# Patient Record
Sex: MALE | Race: WHITE
[De-identification: names, ages, dates, MRNs, and addresses within clinical notes are randomized per-mention and may not be internally consistent; named-entity substitution may affect disease eponyms.]

---

## 2021-07-28 ENCOUNTER — HOSPITAL ENCOUNTER (EMERGENCY)
Dept: HOSPITAL 75 - ER FS | Age: 31
LOS: 1 days | Discharge: PSYCHIATRIC OR SUBSTANCE USE FACILITY | End: 2021-07-29
Payer: SELF-PAY

## 2021-07-28 VITALS — BODY MASS INDEX: 23.09 KG/M2 | WEIGHT: 164.91 LBS | HEIGHT: 70.98 IN

## 2021-07-28 DIAGNOSIS — Z63.0: ICD-10-CM

## 2021-07-28 DIAGNOSIS — R45.851: Primary | ICD-10-CM

## 2021-07-28 DIAGNOSIS — Z20.822: ICD-10-CM

## 2021-07-28 DIAGNOSIS — F17.210: ICD-10-CM

## 2021-07-28 DIAGNOSIS — F32.9: ICD-10-CM

## 2021-07-28 LAB
ALBUMIN SERPL-MCNC: 4.3 GM/DL (ref 3.2–4.5)
ALP SERPL-CCNC: 99 U/L (ref 40–136)
ALT SERPL-CCNC: 14 U/L (ref 0–55)
APAP SERPL-MCNC: < 10 UG/ML (ref 10–30)
APTT PPP: YELLOW S
BACTERIA #/AREA URNS HPF: NEGATIVE /HPF
BARBITURATES UR QL: NEGATIVE
BASOPHILS # BLD AUTO: 0 10^3/UL (ref 0–0.1)
BASOPHILS NFR BLD AUTO: 1 % (ref 0–10)
BENZODIAZ UR QL SCN: NEGATIVE
BILIRUB SERPL-MCNC: 0.2 MG/DL (ref 0.1–1)
BILIRUB UR QL STRIP: NEGATIVE
BUN/CREAT SERPL: 9
CALCIUM SERPL-MCNC: 9.1 MG/DL (ref 8.5–10.1)
CHLORIDE SERPL-SCNC: 105 MMOL/L (ref 98–107)
CO2 SERPL-SCNC: 24 MMOL/L (ref 21–32)
COCAINE UR QL: NEGATIVE
CREAT SERPL-MCNC: 0.8 MG/DL (ref 0.6–1.3)
EOSINOPHIL # BLD AUTO: 0.1 10^3/UL (ref 0–0.3)
EOSINOPHIL NFR BLD AUTO: 2 % (ref 0–10)
FIBRINOGEN PPP-MCNC: CLEAR MG/DL
GFR SERPLBLD BASED ON 1.73 SQ M-ARVRAT: 115 ML/MIN
GLUCOSE SERPL-MCNC: 104 MG/DL (ref 70–105)
GLUCOSE UR STRIP-MCNC: NEGATIVE MG/DL
HCT VFR BLD CALC: 40 % (ref 40–54)
HGB BLD-MCNC: 14.2 G/DL (ref 13.3–17.7)
HYALINE CASTS #/AREA URNS LPF: (no result) /LPF
KETONES UR QL STRIP: (no result)
LEUKOCYTE ESTERASE UR QL STRIP: NEGATIVE
LYMPHOCYTES # BLD AUTO: 1.3 X 10^3 (ref 1–4)
LYMPHOCYTES NFR BLD AUTO: 31 % (ref 12–44)
MANUAL DIFFERENTIAL PERFORMED BLD QL: NO
MCH RBC QN AUTO: 31 PG (ref 25–34)
MCHC RBC AUTO-ENTMCNC: 35 G/DL (ref 32–36)
MCV RBC AUTO: 89 FL (ref 80–99)
METHADONE UR QL SCN: NEGATIVE
METHAMPHETAMINE SCREEN URINE S: NEGATIVE
MONOCYTES # BLD AUTO: 0.5 X 10^3 (ref 0–1)
MONOCYTES NFR BLD AUTO: 12 % (ref 0–12)
NEUTROPHILS # BLD AUTO: 2.3 X 10^3 (ref 1.8–7.8)
NEUTROPHILS NFR BLD AUTO: 54 % (ref 42–75)
NITRITE UR QL STRIP: NEGATIVE
OPIATES UR QL SCN: NEGATIVE
OXYCODONE UR QL: NEGATIVE
PH UR STRIP: 6 [PH] (ref 5–9)
PLATELET # BLD: 224 10^3/UL (ref 130–400)
PMV BLD AUTO: 9.9 FL (ref 7.4–10.4)
POTASSIUM SERPL-SCNC: 3.9 MMOL/L (ref 3.6–5)
PROPOXYPH UR QL: NEGATIVE
PROT SERPL-MCNC: 7.5 GM/DL (ref 6.4–8.2)
PROT UR QL STRIP: NEGATIVE
RBC #/AREA URNS HPF: (no result) /HPF
SALICYLATES SERPL-MCNC: < 0.3 MG/DL (ref 5–20)
SODIUM SERPL-SCNC: 142 MMOL/L (ref 135–145)
SP GR UR STRIP: 1.02 (ref 1.02–1.02)
SQUAMOUS #/AREA URNS HPF: (no result) /HPF
TRICYCLICS UR QL SCN: NEGATIVE
WBC # BLD AUTO: 4.2 10^3/UL (ref 4.3–11)
WBC #/AREA URNS HPF: (no result) /HPF

## 2021-07-28 PROCEDURE — 87636 SARSCOV2 & INF A&B AMP PRB: CPT

## 2021-07-28 PROCEDURE — 85025 COMPLETE CBC W/AUTO DIFF WBC: CPT

## 2021-07-28 PROCEDURE — 99283 EMERGENCY DEPT VISIT LOW MDM: CPT

## 2021-07-28 PROCEDURE — 80329 ANALGESICS NON-OPIOID 1 OR 2: CPT

## 2021-07-28 PROCEDURE — 80053 COMPREHEN METABOLIC PANEL: CPT

## 2021-07-28 PROCEDURE — 81000 URINALYSIS NONAUTO W/SCOPE: CPT

## 2021-07-28 PROCEDURE — 93005 ELECTROCARDIOGRAM TRACING: CPT

## 2021-07-28 PROCEDURE — 80320 DRUG SCREEN QUANTALCOHOLS: CPT

## 2021-07-28 PROCEDURE — 36415 COLL VENOUS BLD VENIPUNCTURE: CPT

## 2021-07-28 PROCEDURE — 80306 DRUG TEST PRSMV INSTRMNT: CPT

## 2021-07-28 SDOH — SOCIAL STABILITY - SOCIAL INSECURITY: PROBLEMS IN RELATIONSHIP WITH SPOUSE OR PARTNER: Z63.0

## 2021-07-28 NOTE — ED PSYCHOSOCIAL
General


Chief Complaint:  Suicidal Ideation Risk


Stated Complaint:  SUICIDAL IDEATION


Nursing Triage Note:  


PT ARRIVED BY PRIVATE VEHICLE WITH CHIEF COMPLAINT OF SUICIDAL IDEATION. [facility] 


BROUGHT PATIENT IN TO THE ED DUE TO SUICIDAL THOUGHTS/ACTIONS. PT WAS SNAPPING 


FAMILY ON SNAPCHAT WITH A GUN TO HIS HEAD SAYING HE WAS GOING TO KILL HIMSELF. 


ON ARRIVAL, PT WAS PUT IN OVERFLOW ROOM WITH OFFICER. VITALS WERE DONE, URINE 


WAS COLLECTED, BLOOD DRAW WAS DONE, AND EKG WAS OBTAINED. PATIENT STATED THERE 


WAS NO REASON FOR SUICIDAL THOUGHT AND STATED NEVER HAPPENED BEFORE, BUT HE TOLD




DR. GIBBONS DIFFERENTLY THAT HE IS HAVING MARRIAGE PROBLEMS.


Source:  patient, RN notes reviewed





History of Present Illness


Date Seen by Provider:  Jul 28, 2021


Time Seen by Provider:  18:11


Initial Comments


30-year-old male presenting with law enforcement due to concerns for suicidal 

thoughts.  He had reportedly been sending Snapchat pictures with a gun to his 

head telling family members that he was going to kill himself.  He states that 

this stress and suicidal thoughts were stemming from marital problems.  He shan

es seeing a therapist himself or with his wife.  He denies other medical 

problems.  He denies any prior suicide attempts or psychiatric care.  He has not

done anything today to physically harm himself.  He has drank several beers 

today.  He initially presented shortly before 5 PM but at the same time he had a

critical patient and he had to wait almost an hour before labs and tests were 

started. Law enforcement is staying with him and he is in protective custody.


Associated Symptoms:  suicidal ideation





Allergies and Home Medications


Allergies


Coded Allergies:  


     No Known Drug Allergies (Unverified , 7/28/21)





Patient Home Medication List


Home Medication List Reviewed:  Yes





Review of Systems


Constitutional:  No chills, No fever


EENTM:  no symptoms reported


Respiratory:  no symptoms reported


Cardiovascular:  no symptoms reported


Gastrointestinal:  no symptoms reported


Genitourinary:  no symptoms reported


Musculoskeletal:  no symptoms reported


Skin:  no symptoms reported


Psychiatric/Neurological:  Depressed, Emotional Problems (marital problems 

causing stress)





Past Medical-Social-Family Hx


Patient Social History


Tobacco Use?:  Yes


Tobacco type used:  Cigarettes


Smoking Status:  Current Everyday Smoker


Substance use?:  No


Alcohol Use?:  Yes


Alcohol Frequency:  Once in a while


Pt feels they are or have been:  No





Past Medical History


Surgery/Hospitalization HX:  


Pt denies





Physical Exam





Vital Signs - First Documented








 7/28/21





 18:15


 


Temp 35.9


 


Pulse 79


 


Resp 16


 


B/P (MAP) 108/53 (71)


 


Pulse Ox 98


 


O2 Delivery Room Air





Capillary Refill : Less Than 3 Seconds


Height, Weight, BMI


Height: '"


Weight: lbs. oz. kg; 23.00 BMI


Method:


General Appearance:  WD/WN, no apparent distress


HEENT:  PERRL/EOMI, pharynx normal


Neck:  non-tender, full range of motion, supple, normal inspection


Respiratory:  chest non-tender, lungs clear, normal breath sounds, no 

respiratory distress, no accessory muscle use


Cardiovascular:  normal peripheral pulses, regular rate, rhythm


Gastrointestinal:  normal bowel sounds, non tender, soft, no pulsatile mass


Extremities:  normal range of motion, non-tender, normal inspection, normal 

capillary refill


Neurologic/Psychiatric:  CNs II-XII nml as tested, alert, oriented x 3


Appearance/Memory:  appropriate appearance, neat


Behavior/Eye Contact:  cooperative, good eye contact


Thoughts/Hallucinations:  normal thought pattern


Skin:  normal color, warm/dry





Progress/Results/Core Measures


Results/Orders


Lab Results





Laboratory Tests








Test


 7/28/21


18:08 7/28/21


18:32 7/28/21


21:10 Range/Units


 


 


White Blood Count


 4.2 L


 


 


 4.3-11.0


10^3/uL


 


Red Blood Count


 4.52 


 


 


 4.35-5.85


10^6/uL


 


Hemoglobin 14.2    13.3-17.7  G/DL


 


Hematocrit 40    40-54  %


 


Mean Corpuscular Volume 89    80-99  FL


 


Mean Corpuscular Hemoglobin 31    25-34  PG


 


Mean Corpuscular Hemoglobin


Concent 35 


 


 


 32-36  G/DL





 


Red Cell Distribution Width 12.2    10.0-14.5  %


 


Platelet Count


 224 


 


 


 130-400


10^3/uL


 


Mean Platelet Volume 9.9    7.4-10.4  FL


 


Immature Granulocyte % (Auto) 0     %


 


Neutrophils (%) (Auto) 54    42-75  %


 


Lymphocytes (%) (Auto) 31    12-44  %


 


Monocytes (%) (Auto) 12    0-12  %


 


Eosinophils (%) (Auto) 2    0-10  %


 


Basophils (%) (Auto) 1    0-10  %


 


Neutrophils # (Auto) 2.3    1.8-7.8  X 10^3


 


Lymphocytes # (Auto) 1.3    1.0-4.0  X 10^3


 


Monocytes # (Auto) 0.5    0.0-1.0  X 10^3


 


Eosinophils # (Auto)


 0.1 


 


 


 0.0-0.3


10^3/uL


 


Basophils # (Auto)


 0.0 


 


 


 0.0-0.1


10^3/uL


 


Immature Granulocyte # (Auto)


 0.0 


 


 


 0.0-0.1


10^3/uL


 


Sodium Level 142    135-145  MMOL/L


 


Potassium Level 3.9    3.6-5.0  MMOL/L


 


Chloride Level 105      MMOL/L


 


Carbon Dioxide Level 24    21-32  MMOL/L


 


Anion Gap 13    5-14  MMOL/L


 


Blood Urea Nitrogen 7    7-18  MG/DL


 


Creatinine


 0.80 


 


 


 0.60-1.30


MG/DL


 


Estimat Glomerular Filtration


Rate 115 


 


 


  





 


BUN/Creatinine Ratio 9     


 


Glucose Level 104      MG/DL


 


Calcium Level 9.1    8.5-10.1  MG/DL


 


Corrected Calcium 8.9    8.5-10.1  MG/DL


 


Total Bilirubin 0.2    0.1-1.0  MG/DL


 


Aspartate Amino Transf


(AST/SGOT) 17 


 


 


 5-34  U/L





 


Alanine Aminotransferase


(ALT/SGPT) 14 


 


 


 0-55  U/L





 


Alkaline Phosphatase 99      U/L


 


Total Protein 7.5    6.4-8.2  GM/DL


 


Albumin 4.3    3.2-4.5  GM/DL


 


Salicylates Level < 0.3 L   5.0-20.0  MG/DL


 


Acetaminophen Level < 10 L   10-30  UG/ML


 


Serum Alcohol < 10    <10  MG/DL


 


Urine Color  YELLOW    


 


Urine Clarity  CLEAR    


 


Urine pH  6.0   5-9  


 


Urine Specific Gravity  1.025 H  1.016-1.022  


 


Urine Protein  NEGATIVE   NEGATIVE  


 


Urine Glucose (UA)  NEGATIVE   NEGATIVE  


 


Urine Ketones  TRACE H  NEGATIVE  


 


Urine Nitrite  NEGATIVE   NEGATIVE  


 


Urine Bilirubin  NEGATIVE   NEGATIVE  


 


Urine Urobilinogen  0.2   < = 1.0  MG/DL


 


Urine Leukocyte Esterase  NEGATIVE   NEGATIVE  


 


Urine RBC (Auto)  NEGATIVE   NEGATIVE  


 


Urine RBC  NONE    /HPF


 


Urine WBC  2-5    /HPF


 


Urine Squamous Epithelial


Cells 


 RARE 


 


  /HPF





 


Urine Crystals  NONE    /LPF


 


Urine Bacteria  NEGATIVE    /HPF


 


Urine Casts  PRESENT    /LPF


 


Urine Hyaline Casts  0-2 H   /LPF


 


Urine Mucus  LARGE H   /LPF


 


Urine Culture Indicated  NO    


 


Urine Opiates Screen  NEGATIVE   NEGATIVE  


 


Urine Oxycodone Screen  NEGATIVE   NEGATIVE  


 


Urine Methadone Screen  NEGATIVE   NEGATIVE  


 


Urine Propoxyphene Screen  NEGATIVE   NEGATIVE  


 


Urine Barbiturates Screen  NEGATIVE   NEGATIVE  


 


Ur Tricyclic Antidepressants


Screen 


 NEGATIVE 


 


 NEGATIVE  





 


Urine Phencyclidine Screen  NEGATIVE   NEGATIVE  


 


Urine Amphetamines Screen  NEGATIVE   NEGATIVE  


 


Urine Methamphetamines Screen  NEGATIVE   NEGATIVE  


 


Urine Benzodiazepines Screen  NEGATIVE   NEGATIVE  


 


Urine Cocaine Screen  NEGATIVE   NEGATIVE  


 


Urine Cannabinoids Screen  NEGATIVE   NEGATIVE  


 


SARS-CoV-2 RNA (RT-PCR)   Not Detected  Not Detecte  








My Orders





Orders - HARITHA GIBBONS MD


Ua Culture If Indicated (7/28/21 16:47)


Cbc With Automated Diff (7/28/21 16:47)


Comprehensive Metabolic Panel (7/28/21 16:47)


Alcohol (7/28/21 16:47)


Drug Screen Stat (Urine) (7/28/21 16:47)


Acetaminophen (7/28/21 16:47)


Salicylate (7/28/21 16:47)


Ekg Tracing (7/28/21 16:47)


 Status Checks/Observation Q15M (7/28/21 16:47)


Covid 19 Inhouse Test (7/28/21 21:01)





Vital Signs/I&O











 7/28/21 7/29/21





 18:15 01:29


 


Temp 35.9 35.9


 


Pulse 79 71


 


Resp 16 14


 


B/P (MAP) 108/53 (71) 112/62


 


Pulse Ox 98 100


 


O2 Delivery Room Air Room Air














Blood Pressure Mean:                    71











Progress


Progress Note #1:  


Progress Note


send labs and urine to medically examine him and clear him for psychiatric evalu

ation.


Progress Note #2:  


   Time:  19:05


Progress Note


Labs and electrocardiogram are stable with no acute significant normality.  His 

alcohol drug screen is negative.  He is medically stable and cleared for eval

uation by mental health.


Progress Note #3:  


   Time:  20:48


Progress Note


After mental health screening and screener spoke with patient's wife and family 

it was felt he was not safe to be discharged to home and required admit for 

psychiatrist evaluation and treatment.


Progress Note #4:  


   Time:  00:54


Progress Note


COVID swab negative. Awaiting placement from [facility]


Progress Note #5:  


   Time:  01:08


Progress Note


[facility] and Dr. Amezcua accepting pt for admit.


Initial ECG Impression Date:  Jul 28, 2021


Initial ECG Impression Time:  18:41


Initial ECG Rate:  58


Initial ECG Rhythm:  Normal Sinus


Initial ECG Comparisson:  No Previous ECG Available


Comment


Normal sinus rhythm with a heart rate of 58 bpm.  DE interval 148 ms.  He has 

early repolarization changes.  No acute ST elevation.  QT interval 446 ms with a

QTc interval of 442 ms.  There is no prior tracing available for comparison.





Departure


Impression





   Primary Impression:  


   Depression with suicidal ideation


   Additional Impression:  


   Marital problem


Disposition:  67 XFER TO PSYCH HOSP/UNIT


Condition:  Stable





Transfer


Transfer Reason:  Exceeds level of care (Psychiatric services)


Time Spoke to Accepting Phy:  01:08


Transfer Progress Notes


[facility] called back and stated pt accepted by Dr. Amezcua to come to [facility].


Transfer Facility:  


[facility]


Method of Transfer:  Law Enforcement





Departure-Patient Inst.


Referrals:  


NO,LOCAL PHYSICIAN (PCP/Family)


Primary Care Physician


Patient Instructions:  OUTPT MENTAL HEALTH SERVICES











HARITHA GIBBONS MD               Jul 28, 2021 18:54

## 2021-07-29 VITALS — SYSTOLIC BLOOD PRESSURE: 112 MMHG | DIASTOLIC BLOOD PRESSURE: 62 MMHG
